# Patient Record
(demographics unavailable — no encounter records)

---

## 2024-10-08 NOTE — HISTORY OF PRESENT ILLNESS
[de-identified] : covid testing, mom tested + yesterday after traveling back from [place], so far father is negative, Bandar is not symptomatic

## 2024-10-08 NOTE — DISCUSSION/SUMMARY
[FreeTextEntry1] : normal exam and asymptomatic but mom is COVID + RVP sent out, office will notify mom w/ result.